# Patient Record
Sex: MALE | Race: WHITE | NOT HISPANIC OR LATINO | Employment: STUDENT | URBAN - METROPOLITAN AREA
[De-identification: names, ages, dates, MRNs, and addresses within clinical notes are randomized per-mention and may not be internally consistent; named-entity substitution may affect disease eponyms.]

---

## 2017-01-05 ENCOUNTER — TREATMENT (OUTPATIENT)
Dept: PHYSICAL THERAPY | Facility: CLINIC | Age: 22
End: 2017-01-05

## 2017-01-05 DIAGNOSIS — M25.562 LEFT KNEE PAIN, UNSPECIFIED CHRONICITY: Primary | ICD-10-CM

## 2017-01-05 PROCEDURE — 97140 MANUAL THERAPY 1/> REGIONS: CPT | Performed by: PHYSICAL THERAPIST

## 2017-01-05 PROCEDURE — 97161 PT EVAL LOW COMPLEX 20 MIN: CPT | Performed by: PHYSICAL THERAPIST

## 2017-01-05 NOTE — LETTER
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  One month ago notices some L HS pain/strain while pushing a door open at school with his L foot.  Saw  at school and was treated with massage and ultrasound but isn't getting any better.  Has had to stop running for the last 3 days.  Pain with knee extension.  Also noticing some R achilles soreness and is wearing inserts in both shoes.  Pain 1-2/10 walking around and 5-6/10 with running.    Encounter Diagnoses   Name Primary?   • Left knee pain, unspecified chronicity Yes       Objective Testing: LEFS 53/80     THERAPY ASSESSMENT:  Pt presents with point tenderness along L medial/posterior tibial plateau, pain with resisted HS MMT and + 2 foot hop test with exquisite L medial knee pain indicative of possible stress fracture.  Pt also presented with L posterior inominate with functional leg length difference that was corrected with MET.  Signs and symptoms point to possible stress fracture so have referred for further testing.  Physical Therapy Diagnosis: L tibial plateau pain; L knee pain   Functional Limitations: Walking, Complaints of Pain   Length of Therapy: Other (Comments)   PT Frequency: Other (Comment) (refer to MD to r/o stress fracture.)   PT Interventions: Manual Therapy   Patient Agrees with Plan of Care: Yes    REHAB POTENTIAL: excellent            SHORT TERM GOALS: in 2 weeks   1. See MD to r/o stress fracture     LONG TERM GOALS: in 4 weeks  1. No reports of pain with ADLs.  2. - hop test and no palpable tenderness  3. Pt to demonstrate normal iliosacral alignment to allow for normal gait mechanics.  4. LEFS > 85% indicating ability to return to sport    PT SIGNATURE: Kendal Uribe, PT   DATE TREATMENT INITIATED: 2017

## 2017-01-05 NOTE — PROGRESS NOTES
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  One month ago notices some L HS pain/strain while pushing a door open at school with his L foot.  Saw  at school and was treated with massage and ultrasound but isn't getting any better.  Has had to stop running for the last 3 days.  Pain with knee extension.  Also noticing some R achilles soreness and is wearing inserts in both shoes.  Pain 1-2/10 walking around and 5-6/10 with running.    Encounter Diagnoses   Name Primary?   • Left knee pain, unspecified chronicity Yes       Objective Testing: LEFS 53/80     THERAPY ASSESSMENT:  Pt presents with point tenderness along L medial/posterior tibial plateau, pain with resisted HS MMT and + 2 foot hop test with exquisite L medial knee pain indicative of possible stress fracture.  Pt also presented with L posterior inominate with functional leg length difference that was corrected with MET.  Signs and symptoms point to possible stress fracture so have referred for further testing.  Physical Therapy Diagnosis: L tibial plateau pain; L knee pain   Functional Limitations: Walking, Complaints of Pain   Length of Therapy: Other (Comments)   PT Frequency: Other (Comment) (refer to MD to r/o stress fracture.)   PT Interventions: Manual Therapy   Patient Agrees with Plan of Care: Yes    REHAB POTENTIAL: excellent            SHORT TERM GOALS: in 2 weeks   1. See MD to r/o stress fracture     LONG TERM GOALS: in 4 weeks  1. No reports of pain with ADLs.  2. - hop test and no palpable tenderness  3. Pt to demonstrate normal iliosacral alignment to allow for normal gait mechanics.  4. LEFS > 85% indicating ability to return to sport      Manual PT [id number] 8 minutes    Timed Code Treatment: 8   Minutes     Total Treatment Time: 30       Minutes    PT SIGNATURE: Kendal Uribe, PT   DATE TREATMENT INITIATED: 1/5/2017    Initial Certification  Certification Period: 2/4/2017  I certify that the therapy services are furnished while this patient is under my care.  The services outlined above are required by this patient, and will be reviewed every 30 days.     PHYSICIAN:       DATE:     Please sign and return via fax to [phone].. Thank you, [facility].

## 2017-01-30 ENCOUNTER — TREATMENT (OUTPATIENT)
Dept: PHYSICAL THERAPY | Facility: CLINIC | Age: 22
End: 2017-01-30

## 2017-01-30 DIAGNOSIS — M25.562 LEFT KNEE PAIN, UNSPECIFIED CHRONICITY: Primary | ICD-10-CM

## 2017-01-30 PROCEDURE — 97110 THERAPEUTIC EXERCISES: CPT | Performed by: PHYSICAL THERAPIST

## 2017-01-30 PROCEDURE — 97140 MANUAL THERAPY 1/> REGIONS: CPT | Performed by: PHYSICAL THERAPIST

## 2017-01-30 PROCEDURE — 97112 NEUROMUSCULAR REEDUCATION: CPT | Performed by: PHYSICAL THERAPIST

## 2017-01-30 NOTE — PROGRESS NOTES
Physical Therapy Daily Progress Note    Subjective     Dorian Styles reports: Got an an MRI and was told he had pes anserinus bursitis.  Started back to running but is starting to notice posteior/medial knee pain    Objective   See Exercise, Manual, and Modality Logs for complete treatment.   L ASIS inf  LLE functionally longer  L ischial tub sup  L Santa's cyst  Reactive balance in SLS  Quad dominant squat  Palpation - L Santa's cyst  Tenderness L semimembranosus/tend    Assessment/Plan  Pt presenting with iliosacral malalignment, palpable tenderness and Baker's cyst as well as a lack of motor control and LE stability.    Progress strengthening /stabilization /functional activity       Manual PT 85302 8 minutes, Therapy Exercise 48234 20 minutes and NMR 16895 10 minutes       Timed Treatment:   38   mins   Total Treatment:     48   mins    Kendal Uribe, PT  Physical Therapist  [place] License # [id number]

## 2017-01-30 NOTE — PATIENT INSTRUCTIONS
Access Code: [email or web address]   URL: [email or web address]   Date: 01/30/2017   Prepared by: Evi Uribe     Program Notes   [email or web address]  Here is a good video on stability of the foot.     Exercises  Supine Bridge with Resistance Band - 10 reps - 1 sets - 10 hold - 1x daily  Side Stepping with Resistance at Thighs - 15 reps - 2 sets - 1x daily  Squat with Resistance at Thighs - 10 reps - 2 sets - 10 hold - 1x daily  Hooklying Clamshell with Resistance - 20 reps - 1 sets - 3 hold - 1x daily  Band Walks - 15 reps - 1 sets - 1x daily  Reverse Band Walks - 15 reps - 1 sets - 1x daily  Single Leg Stance - 2 reps - 1 sets - 30 hold - 1x daily  Long Sitting Ankle Dorsiflexion with Anchored Resistance - 30 reps - 1 sets - 3 hold - 2x daily  Standing Hamstring Stretch with Step - 2 reps - 1 sets - 60 hold - 1x daily

## 2017-02-01 ENCOUNTER — TREATMENT (OUTPATIENT)
Dept: PHYSICAL THERAPY | Facility: CLINIC | Age: 22
End: 2017-02-01

## 2017-02-01 DIAGNOSIS — M25.562 LEFT KNEE PAIN, UNSPECIFIED CHRONICITY: Primary | ICD-10-CM

## 2017-02-01 PROCEDURE — 97110 THERAPEUTIC EXERCISES: CPT | Performed by: PHYSICAL THERAPIST

## 2017-02-01 PROCEDURE — 97140 MANUAL THERAPY 1/> REGIONS: CPT | Performed by: PHYSICAL THERAPIST

## 2017-02-01 PROCEDURE — 97112 NEUROMUSCULAR REEDUCATION: CPT | Performed by: PHYSICAL THERAPIST

## 2017-02-01 NOTE — PROGRESS NOTES
Physical Therapy Daily Progress Note    Subjective     Dorian Styles reports: I'm feeling out of alignment and noticing clicking in medial knee with active flex/  L leg longer  L ASIS inf  L ischial tub sup  Palpable tenderness L MCL/medial HS insertion  Palpable click with knee flexion/ext  - Reid    Objective   See Exercise, Manual, and Modality Logs for complete treatment.       Assessment/Plan  Pt presenting with anterior L inominate; was taught SI self correction and kinesiotaping technique for space correction to medial knee  Progress strengthening /stabilization /functional activity           Manual PT [id number] 8 minutes, Therapy Exercise 80438 10 minutes and NMR [id number] 20 minutes       Timed Treatment:   38   mins   Total Treatment:     48   mins    Kendal Uribe, PT  Physical Therapist  [place] License # [id number]

## 2017-02-01 NOTE — PATIENT INSTRUCTIONS
Access Code: [email or web address]   URL: [email or web address]   Date: 02/01/2017   Prepared by: Evi Uribe     Exercises  90/90 SI Joint Self-Correction - 10 reps - 1 sets - 10 hold - 1x daily  90/90 SI Joint Self-Correction with Dowel - 10 reps - 1 sets - 10 hold - 1x daily  Supine Bridge with Mini Swiss Ball Between Knees - 10 reps - 1 sets - 10 hold - 2x daily  Standing Repeated Hip Extension on Foam Pad - 30 reps - 2 sets - 3 hold - 1x daily

## 2017-02-27 ENCOUNTER — DOCUMENTATION (OUTPATIENT)
Dept: PHYSICAL THERAPY | Facility: CLINIC | Age: 22
End: 2017-02-27

## 2017-02-27 DIAGNOSIS — M25.562 LEFT KNEE PAIN, UNSPECIFIED CHRONICITY: Primary | ICD-10-CM

## 2017-02-27 NOTE — PROGRESS NOTES
Discharge Report    Patient Name: Dorian Styles       Patient MRN: [id number]  : 1995  Physician:self referral  Date: 2017    Encounter Diagnoses   Name Primary?   • Left knee pain, unspecified chronicity Yes       Treatment has included therapeutic exercise, neuromuscular re-education, manual therapy and cryotherapy    Dorian Styles reports: I'm feeling out of alignment and noticing clicking in medial knee with active flex/  L leg longer  L ASIS inf  L ischial tub sup  Palpable tenderness L MCL/medial HS insertion  Palpable click with knee flexion/ext  - Reid    Assessment: On 17 Pt presenting with anterior L inominate; was taught SI self correction and kinesiotaping technique for space correction to medial knee    Progress towards goals: Partially Met    Discharge Reason: Anticipate patient will achieve long-term goals independently and pt did not make follow up PT appt      Plan of Care: Continue with current home exercise program as instructed    Prognosis: good    Understanding at Discharge: unknown